# Patient Record
Sex: FEMALE | Race: WHITE | NOT HISPANIC OR LATINO | Employment: FULL TIME | URBAN - METROPOLITAN AREA
[De-identification: names, ages, dates, MRNs, and addresses within clinical notes are randomized per-mention and may not be internally consistent; named-entity substitution may affect disease eponyms.]

---

## 2023-04-16 ENCOUNTER — OFFICE VISIT (OUTPATIENT)
Dept: URGENT CARE | Facility: CLINIC | Age: 23
End: 2023-04-16
Payer: COMMERCIAL

## 2023-04-16 VITALS
HEIGHT: 64 IN | DIASTOLIC BLOOD PRESSURE: 40 MMHG | HEART RATE: 105 BPM | RESPIRATION RATE: 16 BRPM | SYSTOLIC BLOOD PRESSURE: 100 MMHG | TEMPERATURE: 99.2 F | WEIGHT: 139 LBS | BODY MASS INDEX: 23.73 KG/M2 | OXYGEN SATURATION: 97 %

## 2023-04-16 DIAGNOSIS — J02.9 SORE THROAT: ICD-10-CM

## 2023-04-16 DIAGNOSIS — J06.9 URTI (ACUTE UPPER RESPIRATORY INFECTION): ICD-10-CM

## 2023-04-16 PROBLEM — S93.401A SPRAIN OF RIGHT ANKLE: Status: ACTIVE | Noted: 2020-03-06

## 2023-04-16 LAB
INT CON NEG: NORMAL
INT CON POS: NORMAL
S PYO AG THROAT QL: NEGATIVE

## 2023-04-16 PROCEDURE — 99203 OFFICE O/P NEW LOW 30 MIN: CPT

## 2023-04-16 PROCEDURE — 87880 STREP A ASSAY W/OPTIC: CPT

## 2023-04-16 ASSESSMENT — ENCOUNTER SYMPTOMS
DIARRHEA: 0
MYALGIAS: 1
COUGH: 0
SORE THROAT: 1
CHILLS: 0
FEVER: 1
VOMITING: 0
HEADACHES: 1

## 2023-04-16 NOTE — PROGRESS NOTES
"Subjective     Ryleigh Jayne Thomas is a 22 y.o. female who presents with Pharyngitis (Headaches, body aches,sore throat, hard to swallow. )            Pharyngitis   This is a new problem. The current episode started in the past 7 days. The problem has been gradually worsening. The maximum temperature recorded prior to her arrival was 102 - 102.9 F. Associated symptoms include headaches. Pertinent negatives include no congestion, coughing, diarrhea or vomiting. Treatments tried: Theraflu.     Patient presents with symptoms that started 4 days ago.  She reports headache and nausea initially, which she reports that she attributed to having menstruation at that time.  Today, she had fever with temperature highest at 102 °F.  She also reports malaise, fatigue, sore throat.,  And myalgias.  She denies any nasal congestion, cough, vomiting, or diarrhea.  She denies any known sick contacts.  She is not COVID vaccinated.    Patient's current problem list, medications, and past medical/surgical history were reviewed in Epic.    PMH:  has no past medical history on file.  MEDS: No current outpatient medications on file.  ALLERGIES: Not on File  SURGHX: No past surgical history on file.  SOCHX:    FH: Reviewed with patient, not pertinent to this visit.     Review of Systems   Constitutional:  Positive for fever and malaise/fatigue. Negative for chills.   HENT:  Positive for sore throat. Negative for congestion.    Respiratory:  Negative for cough.    Gastrointestinal:  Negative for diarrhea and vomiting.   Musculoskeletal:  Positive for myalgias.   Neurological:  Positive for headaches.            Objective   /40 (BP Location: Left arm, Patient Position: Sitting, BP Cuff Size: Adult)   Pulse (!) 105   Temp 37.3 °C (99.2 °F) (Temporal)   Resp 16   Ht 1.626 m (5' 4\")   Wt 63 kg (139 lb)   LMP 04/12/2023 (Exact Date)   SpO2 97%   BMI 23.86 kg/m²       Physical Exam  Constitutional:       Appearance: Normal " appearance.   HENT:      Head: Normocephalic.      Nose: Nose normal.      Mouth/Throat:      Pharynx: Pharyngeal swelling and posterior oropharyngeal erythema present.   Eyes:      Extraocular Movements: Extraocular movements intact.   Cardiovascular:      Rate and Rhythm: Normal rate and regular rhythm.      Pulses: Normal pulses.      Heart sounds: Normal heart sounds.   Pulmonary:      Effort: Pulmonary effort is normal.      Breath sounds: Normal breath sounds.   Musculoskeletal:         General: Normal range of motion.      Cervical back: Normal range of motion.   Skin:     General: Skin is warm and dry.   Neurological:      General: No focal deficit present.      Mental Status: She is alert.   Psychiatric:         Mood and Affect: Mood normal.         Behavior: Behavior normal.         Judgment: Judgment normal.     Results:    Strep a-negative       Assessment & Plan     1. URTI (acute upper respiratory infection)      2. Sore throat    - POCT Rapid Strep A      Patient tested negative for strep A.  Her presentation is consistent with upper respiratory tract infection, most likely viral.  Advised on symptomatic treatment at home. Discussed treatment plan with patient, /he is agreeable and verbalized understanding.  Educated patient on signs and symptoms watch out for, when to return to the clinic or go to the ER.    Recommended supportive treatment at home:  OTC Tylenol or Motrin for fever/discomfort.  OTC supportive care for nasal congestion - saline nasal spray/Flonase nasal spray and/or netipot  Humidifier and steam inhalation/warm showers.  Increase oral fluid intake.  Warm saline gargles for sore throat      Electronically Signed by ASAD Bravo